# Patient Record
Sex: MALE | Race: WHITE | HISPANIC OR LATINO | Employment: UNEMPLOYED | URBAN - METROPOLITAN AREA
[De-identification: names, ages, dates, MRNs, and addresses within clinical notes are randomized per-mention and may not be internally consistent; named-entity substitution may affect disease eponyms.]

---

## 2021-03-29 ENCOUNTER — IMMUNIZATION (OUTPATIENT)
Dept: FAMILY MEDICINE | Facility: CLINIC | Age: 26
End: 2021-03-29

## 2021-03-29 DIAGNOSIS — Z23 NEED FOR VACCINATION: Primary | ICD-10-CM

## 2021-03-29 PROCEDURE — 0001A COVID-19, MRNA, LNP-S, PF, 30 MCG/0.3 ML DOSE VACCINE: CPT | Mod: CV19,,, | Performed by: FAMILY MEDICINE

## 2021-03-29 PROCEDURE — 0001A COVID-19, MRNA, LNP-S, PF, 30 MCG/0.3 ML DOSE VACCINE: ICD-10-PCS | Mod: CV19,,, | Performed by: FAMILY MEDICINE

## 2021-03-29 PROCEDURE — 91300 COVID-19, MRNA, LNP-S, PF, 30 MCG/0.3 ML DOSE VACCINE: ICD-10-PCS | Mod: ,,, | Performed by: FAMILY MEDICINE

## 2021-03-29 PROCEDURE — 91300 COVID-19, MRNA, LNP-S, PF, 30 MCG/0.3 ML DOSE VACCINE: CPT | Mod: ,,, | Performed by: FAMILY MEDICINE

## 2021-04-19 ENCOUNTER — IMMUNIZATION (OUTPATIENT)
Dept: FAMILY MEDICINE | Facility: CLINIC | Age: 26
End: 2021-04-19

## 2021-04-19 DIAGNOSIS — Z23 NEED FOR VACCINATION: Primary | ICD-10-CM

## 2021-04-19 PROCEDURE — 91300 COVID-19, MRNA, LNP-S, PF, 30 MCG/0.3 ML DOSE VACCINE: ICD-10-PCS | Mod: ,,, | Performed by: INTERNAL MEDICINE

## 2021-04-19 PROCEDURE — 0002A COVID-19, MRNA, LNP-S, PF, 30 MCG/0.3 ML DOSE VACCINE: ICD-10-PCS | Mod: CV19,,, | Performed by: INTERNAL MEDICINE

## 2021-04-19 PROCEDURE — 0002A COVID-19, MRNA, LNP-S, PF, 30 MCG/0.3 ML DOSE VACCINE: CPT | Mod: CV19,,, | Performed by: INTERNAL MEDICINE

## 2021-04-19 PROCEDURE — 91300 COVID-19, MRNA, LNP-S, PF, 30 MCG/0.3 ML DOSE VACCINE: CPT | Mod: ,,, | Performed by: INTERNAL MEDICINE

## 2021-07-29 ENCOUNTER — OFFICE VISIT (OUTPATIENT)
Dept: FAMILY MEDICINE | Facility: CLINIC | Age: 26
End: 2021-07-29
Payer: OTHER GOVERNMENT

## 2021-07-29 VITALS
OXYGEN SATURATION: 98 % | BODY MASS INDEX: 18.81 KG/M2 | DIASTOLIC BLOOD PRESSURE: 66 MMHG | HEART RATE: 67 BPM | WEIGHT: 124.13 LBS | SYSTOLIC BLOOD PRESSURE: 102 MMHG | HEIGHT: 68 IN

## 2021-07-29 DIAGNOSIS — Z00.00 WELLNESS EXAMINATION: Primary | ICD-10-CM

## 2021-07-29 DIAGNOSIS — B07.9 WART ON THUMB: ICD-10-CM

## 2021-07-29 DIAGNOSIS — M25.562 CHRONIC PAIN OF BOTH KNEES: ICD-10-CM

## 2021-07-29 DIAGNOSIS — M25.561 CHRONIC PAIN OF BOTH KNEES: ICD-10-CM

## 2021-07-29 DIAGNOSIS — K21.9 GASTROESOPHAGEAL REFLUX DISEASE WITHOUT ESOPHAGITIS: ICD-10-CM

## 2021-07-29 DIAGNOSIS — G89.29 CHRONIC PAIN OF BOTH KNEES: ICD-10-CM

## 2021-07-29 PROCEDURE — 99395 PREV VISIT EST AGE 18-39: CPT | Mod: S$PBB,,, | Performed by: INTERNAL MEDICINE

## 2021-07-29 PROCEDURE — 99999 PR PBB SHADOW E&M-EST. PATIENT-LVL IV: ICD-10-PCS | Mod: PBBFAC,,, | Performed by: INTERNAL MEDICINE

## 2021-07-29 PROCEDURE — 99999 PR PBB SHADOW E&M-EST. PATIENT-LVL IV: CPT | Mod: PBBFAC,,, | Performed by: INTERNAL MEDICINE

## 2021-07-29 PROCEDURE — 99214 OFFICE O/P EST MOD 30 MIN: CPT | Mod: PBBFAC,PO | Performed by: INTERNAL MEDICINE

## 2021-07-29 PROCEDURE — 99395 PR PREVENTIVE VISIT,EST,18-39: ICD-10-PCS | Mod: S$PBB,,, | Performed by: INTERNAL MEDICINE

## 2021-07-29 RX ORDER — CALCIUM CARB/MAGNESIUM CARB 311-232MG
TABLET ORAL
COMMUNITY

## 2021-07-29 RX ORDER — OMEPRAZOLE 20 MG/1
20 CAPSULE, DELAYED RELEASE ORAL DAILY
Qty: 15 CAPSULE | Refills: 0 | Status: SHIPPED | OUTPATIENT
Start: 2021-07-29 | End: 2024-01-19

## 2024-01-19 ENCOUNTER — OFFICE VISIT (OUTPATIENT)
Dept: FAMILY MEDICINE | Facility: CLINIC | Age: 29
End: 2024-01-19
Payer: COMMERCIAL

## 2024-01-19 VITALS
DIASTOLIC BLOOD PRESSURE: 68 MMHG | HEART RATE: 76 BPM | HEIGHT: 68 IN | OXYGEN SATURATION: 97 % | SYSTOLIC BLOOD PRESSURE: 110 MMHG | WEIGHT: 139.25 LBS | BODY MASS INDEX: 21.1 KG/M2

## 2024-01-19 DIAGNOSIS — H61.20 IMPACTED CERUMEN, UNSPECIFIED LATERALITY: ICD-10-CM

## 2024-01-19 DIAGNOSIS — M25.571 CHRONIC PAIN OF RIGHT ANKLE: ICD-10-CM

## 2024-01-19 DIAGNOSIS — G89.29 CHRONIC PAIN OF RIGHT ANKLE: ICD-10-CM

## 2024-01-19 DIAGNOSIS — M25.562 CHRONIC PAIN OF BOTH KNEES: ICD-10-CM

## 2024-01-19 DIAGNOSIS — M25.561 CHRONIC PAIN OF BOTH KNEES: ICD-10-CM

## 2024-01-19 DIAGNOSIS — G89.29 CHRONIC PAIN OF BOTH KNEES: ICD-10-CM

## 2024-01-19 DIAGNOSIS — Z00.00 WELLNESS EXAMINATION: Primary | ICD-10-CM

## 2024-01-19 PROCEDURE — 99395 PREV VISIT EST AGE 18-39: CPT | Mod: S$GLB,,, | Performed by: INTERNAL MEDICINE

## 2024-01-19 PROCEDURE — 99999 PR PBB SHADOW E&M-EST. PATIENT-LVL IV: CPT | Mod: PBBFAC,,, | Performed by: INTERNAL MEDICINE

## 2024-01-19 NOTE — PROGRESS NOTES
"[facility]  Primary Care   [address]       Patient ID: Sotero Rivers     Chief Complaint:   Chief Complaint   Patient presents with    Annual Exam        HPI:  Annual exam.  It has been awhile since I have seen him but he has been doing well overall.  He still does have chronic knee pain especially when he gets up from a seated position and this is made worse because his job now requires him to be at a desk for 10 hours a day on occasion.  If he has not doing that he is walking around a plan and can do up to 20,000 steps in a day which is a lot.  He did have a stingray sting to his right medial malleolus about a year ago, and has had chronic pain there since then.  The pain is more on occasion but can feel like a knife in that bone.  He is concerned he may have some retained stinger in there.  I will refer him to Dr. Liz for both of these issues.  He does need a referral to ENT for a left cerumen impaction so I have placed that.  Vital signs look good.  He politely declines a flu and COVID vaccine at this time.  Of note, his father who is also my patient was diagnosed with esophageal cancer coming up on 1 year ago but he is cancer free and I am very happy about that.    Review of Systems       Knee pain   Ankle pain     Objective:      Physical Exam   Physical Exam       Normal    Vitals:   Vitals:    01/19/24 1512   BP: 110/68   Pulse: 76   SpO2: 97%   Weight: 63.2 kg (139 lb 3.5 oz)   Height: 5' 8" (1.727 m)        Assessment:           Plan:       Sotero Rivers  was seen today for follow-up and may need lab work.    Diagnoses and all orders for this visit:    Sotero was seen today for annual exam.    Diagnoses and all orders for this visit:    Wellness examination    Chronic pain of right ankle  Comments:  h/o sting ray sting to right ankle  Orders:  -     Ambulatory referral/consult to Orthopedics; Future    Try OTC Powerstep Carmel insoles on " [employer]    Chronic pain of both knees  Discuss with Dr. Shalonda Shen cerumen, unspecified laterality  -     Ambulatory referral/consult to ENT; Future           Juan Russ MD

## 2024-01-26 ENCOUNTER — OFFICE VISIT (OUTPATIENT)
Dept: OTOLARYNGOLOGY | Facility: CLINIC | Age: 29
End: 2024-01-26
Payer: COMMERCIAL

## 2024-01-26 ENCOUNTER — CLINICAL SUPPORT (OUTPATIENT)
Dept: AUDIOLOGY | Facility: CLINIC | Age: 29
End: 2024-01-26
Payer: COMMERCIAL

## 2024-01-26 VITALS — WEIGHT: 137.56 LBS | HEIGHT: 68 IN | BODY MASS INDEX: 20.85 KG/M2

## 2024-01-26 DIAGNOSIS — H93.13 TINNITUS OF BOTH EARS: ICD-10-CM

## 2024-01-26 DIAGNOSIS — M25.571 CHRONIC PAIN OF RIGHT ANKLE: Primary | ICD-10-CM

## 2024-01-26 DIAGNOSIS — H90.5 HIGH FREQUENCY SENSORINEURAL HEARING LOSS OF LEFT EAR: ICD-10-CM

## 2024-01-26 DIAGNOSIS — H61.22 IMPACTED CERUMEN OF LEFT EAR: ICD-10-CM

## 2024-01-26 DIAGNOSIS — Z57.0 OCCUPATIONAL EXPOSURE TO NOISE: ICD-10-CM

## 2024-01-26 DIAGNOSIS — H61.22 HEARING LOSS DUE TO CERUMEN IMPACTION, LEFT: Primary | ICD-10-CM

## 2024-01-26 DIAGNOSIS — H91.92 HIGH FREQUENCY HEARING LOSS, LEFT: Primary | ICD-10-CM

## 2024-01-26 DIAGNOSIS — G89.29 CHRONIC PAIN OF RIGHT ANKLE: Primary | ICD-10-CM

## 2024-01-26 PROCEDURE — 92567 TYMPANOMETRY: CPT | Mod: S$GLB,,, | Performed by: AUDIOLOGIST

## 2024-01-26 PROCEDURE — 99203 OFFICE O/P NEW LOW 30 MIN: CPT | Mod: 25,S$GLB,, | Performed by: NURSE PRACTITIONER

## 2024-01-26 PROCEDURE — 69210 REMOVE IMPACTED EAR WAX UNI: CPT | Mod: LT,S$GLB,, | Performed by: NURSE PRACTITIONER

## 2024-01-26 PROCEDURE — 92557 COMPREHENSIVE HEARING TEST: CPT | Mod: S$GLB,,, | Performed by: AUDIOLOGIST

## 2024-01-26 PROCEDURE — 99999 PR PBB SHADOW E&M-EST. PATIENT-LVL III: CPT | Mod: PBBFAC,,, | Performed by: NURSE PRACTITIONER

## 2024-01-26 PROCEDURE — 99999 PR PBB SHADOW E&M-EST. PATIENT-LVL I: CPT | Mod: PBBFAC,,, | Performed by: AUDIOLOGIST

## 2024-01-26 SDOH — SOCIAL DETERMINANTS OF HEALTH (SDOH): OCCUPATIONAL EXPOSURE TO NOISE: Z57.0

## 2024-01-26 NOTE — Clinical Note
Please see attached audiological test results and recommendations. Please contact the patient if you have further recommendations concerning the asymmetry.

## 2024-01-26 NOTE — PROGRESS NOTES
The patient was referred by Jaylin Celaya NP for a hearing evaluation.    Report from the patient was as follows:    Tinnitus - AU  History of Loud Noise Exposure -  work environment  Family History of Hearing Loss - father with hearing loss due to loud noise exposure    Otoscopic screening revealed a clear view of TM AU    A hearing evaluation was performed today. Test results indicated mild to moderate high frequency hearing loss in the left ear and hearing within normal limits in the right ear. Impedance testing showed a Type A tympanogram in each ear, consistent with normal middle ear function.    The recommendations were as follows:    (1)  Medical evaluation due to asymmetry  (2)  Ear protection in loud noise   (3)  Hearing evaluation in one year or sooner if hearing decrease is noted       Today's test results and recommendations were discussed with the patient.

## 2024-01-26 NOTE — PROGRESS NOTES
Subjective     Patient ID: Sotero Rivers is a 28 y.o. male.    Chief Complaint: Cerumen Impaction    HPI  Patient is new to ENT, referred by Dr. Russ for consultation for cerumen impaction. Patient saw Dr. Russ last week and was noted to have cerumen impaction. Patient reports h/o recurrent cerumen impactions. Has his ears flushed regularly. Muffled hearing AS. 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 Would like to purchase custom made ear plugs for hearing protection.     Review of Systems   Constitutional: Negative.    HENT:  Positive for hearing loss.    Eyes: Negative.    Respiratory: Negative.     Cardiovascular: Negative.    Gastrointestinal: Negative.    Musculoskeletal: Negative.    Integumentary:  Negative.   Neurological: Negative.    Hematological: Negative.    Psychiatric/Behavioral: Negative.            Objective     Physical Exam  Vitals and nursing note reviewed.   Constitutional:       General: He is not in acute distress.     Appearance: He is well-developed. He is not ill-appearing.   HENT:      Head: Normocephalic and atraumatic.      Right Ear: Hearing, tympanic membrane, ear canal and external ear normal. No middle ear effusion. Tympanic membrane is not erythematous.      Left Ear: Hearing, tympanic membrane, ear canal and external ear normal.  No middle ear effusion. Tympanic membrane is not erythematous.      Nose: Nose normal.   Eyes:      General: Lids are normal. No scleral icterus.        Right eye: No discharge.         Left eye: No discharge.   Neck:      Trachea: Trachea normal. No tracheal deviation.   Cardiovascular:      Rate and Rhythm: Normal rate.   Pulmonary:      Effort: Pulmonary effort is normal. No respiratory distress.      Breath sounds: No stridor. No wheezing.   Musculoskeletal:         General: Normal range of motion.      Cervical back: Normal range of motion.   Skin:     General: Skin is warm and dry.   Neurological:      Mental Status: He is  alert and oriented to person, place, and time.      Coordination: Coordination is intact.      Gait: Gait normal.   Psychiatric:         Attention and Perception: Attention normal.         Mood and Affect: Mood normal.         Speech: Speech normal.         Behavior: Behavior normal. Behavior is cooperative.     SEPARATE PROCEDURE IN OFFICE:   Procedure: Removal of impacted cerumen, LEFT   Pre Procedure Diagnosis: Cerumen Impaction   Post Procedure Diagnosis: Cerumen Impaction   Verbal informed consent in regards to risk of trauma to ear canal, ear drum or hearing, discomfort during procedure and/or inability to remove cerumen impaction in one session or unforeseen events or complications.   No anesthesia.     Procedure in detail:   Ear canal visualized bilateral with appropriate size ear speculum utilizing Operating Head Binocular Otomicroscope   Utilizing the following:  Ring curet and 90 degree angle pick was used. The impacted cerumen of the ear canals was removed atraumatically. The TM and EAC were then inspected and found to be clear of wax. See description of TMs/EACs in PE above.   Complications: No   Condition: Improved/Good       Assessment and Plan     1. Hearing loss due to cerumen impaction, left    2. Impacted cerumen of left ear  -     Ambulatory referral/consult to ENT    3. Occupational exposure to noise    4. 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

## 2024-01-29 ENCOUNTER — PATIENT MESSAGE (OUTPATIENT)
Dept: OTOLARYNGOLOGY | Facility: CLINIC | Age: 29
End: 2024-01-29
Payer: COMMERCIAL

## 2024-01-29 ENCOUNTER — PATIENT MESSAGE (OUTPATIENT)
Dept: AUDIOLOGY | Facility: CLINIC | Age: 29
End: 2024-01-29
Payer: COMMERCIAL

## 2024-01-29 DIAGNOSIS — H90.42 SENSORINEURAL HEARING LOSS (SNHL) OF LEFT EAR WITH UNRESTRICTED HEARING OF RIGHT EAR: Primary | ICD-10-CM

## 2024-01-30 ENCOUNTER — TELEPHONE (OUTPATIENT)
Dept: OTOLARYNGOLOGY | Facility: CLINIC | Age: 29
End: 2024-01-30
Payer: COMMERCIAL

## 2024-01-30 NOTE — TELEPHONE ENCOUNTER
----- Message from Coty Porter sent at 1/30/2024  7:08 AM CST -----  Regarding: Pt called to speak to the nurse regarding his care and upcoming appt and pt would like a call back this morning  Patient Advice:    Pt called to speak to the nurse regarding his care and upcoming appt and pt would like a call back this morning    Pt can be reached at  [phone]

## 2024-02-02 ENCOUNTER — OFFICE VISIT (OUTPATIENT)
Dept: ORTHOPEDICS | Facility: CLINIC | Age: 29
End: 2024-02-02
Payer: COMMERCIAL

## 2024-02-02 ENCOUNTER — CLINICAL SUPPORT (OUTPATIENT)
Dept: AUDIOLOGY | Facility: CLINIC | Age: 29
End: 2024-02-02
Payer: COMMERCIAL

## 2024-02-02 ENCOUNTER — HOSPITAL ENCOUNTER (OUTPATIENT)
Dept: RADIOLOGY | Facility: HOSPITAL | Age: 29
Discharge: HOME | End: 2024-02-02
Attending: ORTHOPAEDIC SURGERY
Payer: COMMERCIAL

## 2024-02-02 DIAGNOSIS — G89.29 CHRONIC PAIN OF RIGHT ANKLE: ICD-10-CM

## 2024-02-02 DIAGNOSIS — S91.041A: Primary | ICD-10-CM

## 2024-02-02 DIAGNOSIS — M25.571 PAIN IN JOINT INVOLVING RIGHT ANKLE AND FOOT: ICD-10-CM

## 2024-02-02 DIAGNOSIS — M25.571 CHRONIC PAIN OF RIGHT ANKLE: ICD-10-CM

## 2024-02-02 DIAGNOSIS — Z46.2 ENCOUNTER FOR OTHER EARMOLD IMPRESSION: Primary | ICD-10-CM

## 2024-02-02 DIAGNOSIS — G89.29 CHRONIC PAIN OF BOTH KNEES: ICD-10-CM

## 2024-02-02 DIAGNOSIS — M25.562 CHRONIC PAIN OF BOTH KNEES: ICD-10-CM

## 2024-02-02 DIAGNOSIS — M25.561 CHRONIC PAIN OF BOTH KNEES: ICD-10-CM

## 2024-02-02 PROCEDURE — 99999 PR PBB SHADOW E&M-EST. PATIENT-LVL III: CPT | Mod: PBBFAC,,, | Performed by: ORTHOPAEDIC SURGERY

## 2024-02-02 PROCEDURE — 99499 UNLISTED E&M SERVICE: CPT | Mod: S$GLB,,, | Performed by: AUDIOLOGIST

## 2024-02-02 PROCEDURE — 99204 OFFICE O/P NEW MOD 45 MIN: CPT | Mod: S$GLB,,, | Performed by: ORTHOPAEDIC SURGERY

## 2024-02-02 PROCEDURE — 73630 X-RAY EXAM OF FOOT: CPT | Mod: TC,PO,RT

## 2024-02-02 PROCEDURE — 73630 X-RAY EXAM OF FOOT: CPT | Mod: 26,RT,, | Performed by: RADIOLOGY

## 2024-02-02 PROCEDURE — 73610 X-RAY EXAM OF ANKLE: CPT | Mod: 26,RT,, | Performed by: RADIOLOGY

## 2024-02-02 PROCEDURE — 99999 PR PBB SHADOW E&M-EST. PATIENT-LVL I: CPT | Mod: PBBFAC,,, | Performed by: AUDIOLOGIST

## 2024-02-02 PROCEDURE — 73610 X-RAY EXAM OF ANKLE: CPT | Mod: TC,PO,RT

## 2024-02-02 NOTE — PROGRESS NOTES
Status/Diagnosis: Right medial ankle foreign body vs granuloma.  Date of Surgery: none  Date of Injury: 04/14/2023  Return visit: After MR  X-rays on Return: none    Chief Complaint:   Chief Complaint   Patient presents with    Right Ankle - Injury, Pain     Present History:  Patient presents today via referral from Dr. Juan Russ   Sotero Rivers is a 28 y.o. male with complaints of intermittent right medial based ankle pain.  Unusual history as patient was stung by a stingray involving the medial ankle in April 2023.  Patient removed a viri shortly thereafter.  Pain has waxed and waned over the last 10+ months.  Has tried shoe wear and activity modification.  Pain can be present at rest and with weight-bearing.  Patient is active.  Works on his feet at a power plant.  Denies any drainage, fever, chills.  Denies any numbness or tingling.  Point tenderness present not just due to mass effect with shoe wear.  Otherwise healthy.  Denies tobacco use.      Past Medical History:   Diagnosis Date    Knee pain, bilateral     Wart on thumb        No past surgical history on file.    Current Outpatient Medications   Medication Sig    melatonin 5 mg TbDL Take by mouth.     No current facility-administered medications for this visit.       Review of patient's allergies indicates:  No Known Allergies    Family History   Problem Relation Age of Onset    No Known Problems Mother     Hyperlipidemia Father     Hypertension Father     Esophageal cancer Father        Social History     Socioeconomic History    Marital status: Single   Occupational History    Occupation: Works at [employer]   Tobacco Use    Smoking status: Never    Smokeless tobacco: Never   Substance and Sexual Activity    Alcohol use: Not Currently    Drug use: Never     Social Determinants of Health     Financial Resource Strain: Low Risk  (1/19/2024)    Overall Financial Resource Strain (CARDIA)     Difficulty of Paying Living Expenses: Not hard at all   Food  Insecurity: No Food Insecurity (1/19/2024)    Hunger Vital Sign     Worried About Running Out of Food in the Last Year: Never true     Ran Out of Food in the Last Year: Never true   Transportation Needs: No Transportation Needs (1/19/2024)    PRAPARE - Transportation     Lack of Transportation (Medical): No     Lack of Transportation (Non-Medical): No   Physical Activity: Insufficiently Active (1/19/2024)    Exercise Vital Sign     Days of Exercise per Week: 4 days     Minutes of Exercise per Session: 20 min   Stress: No Stress Concern Present (1/19/2024)    [place] of [employer]ational [employer] - Occupational Stress Questionnaire     Feeling of Stress : Only a little   Social Connections: Unknown (1/19/2024)    Social Connection and Isolation Panel [NHANES]     Frequency of Communication with Friends and Family: More than three times a week     Frequency of Social Gatherings with Friends and Family: More than three times a week     Active Member of Clubs or Organizations: No     Attends Club or Organization Meetings: Never     Marital Status: Never    Housing Stability: Low Risk  (1/19/2024)    Housing Stability Vital Sign     Unable to Pay for Housing in the Last Year: No     Number of Places Lived in the Last Year: 1     Unstable Housing in the Last Year: No       Physical exam:  There were no vitals filed for this visit.  There is no height or weight on file to calculate BMI.  General: In no apparent distress; well developed and well nourished.  HEENT: normocephalic; atraumatic.  Cardiovascular: regular rate.  Respiratory: no increased work of breathing.  Musculoskeletal:   Gait: nonantalgic  Inspection:   Small 3 x 8 mm area scarring versus granuloma involving the medial ankle just at the tip of the medial malleolus.  No open wound noted.  No drainage.  No adjacent warmth or erythema.  No signs or symptoms of infection.  Point tenderness on deep palpation at this site.  Full painless ankle range of  motion with no suggestion of septic arthritis.  No laxity with anterior drawer or varus/valgus talar tilt testing.  Silfverskiold: Negative  Alignment:  Knee: neutral               Ankle: neutral              Hindfoot: neutral              Forefoot: neutral   Strength:              Dorsiflexion 5/5  Plantar flexion 5/5  Inversion 5/5  Eversion 5/5  Sensation:              SILT distally  ROM:              Ankle: full and painless              Subtalar: full and painless  Pulses: Palpable pedal pulse                   Imaging Studies/Outside documentation:  I have ordered/reviewed/interpreted the following images/outside documentation:  1. Weight-bearing 3-views of Right foot and ankle:   On my independent review, no acute bony abnormality noted.  Ankle mortise remains congruent.  Moderate decreased calcaneal pitch.  Talonavicular uncoverage within normal limits.  Large accessory navicular.  Incidental os trigonum.  No significant degenerative joint changes.        Assessment:  Sotero Rivers is a 28 y.o. male with Right medial ankle foreign body vs granuloma.     Plan:   Clinical and radiographic findings were discussed.  Given the chronicity of patient's symptoms, we will obtain MRI right ankle without contrast for further evaluation.  Patient will continue to treat this symptomatically in the meantime.    Return to clinic after MRI is complete.  Pending results, may consider surgical excision if retained foreign body is indeed present.    Patient voiced understanding.  All questions were answered.    This note was created using voice recognition software and may contain grammatical errors.

## 2024-02-07 ENCOUNTER — TELEPHONE (OUTPATIENT)
Dept: AUDIOLOGY | Facility: CLINIC | Age: 29
End: 2024-02-07
Payer: COMMERCIAL

## 2024-02-07 NOTE — TELEPHONE ENCOUNTER
Left voicemail informing patient I was calling to discuss type of noise suppression plug he would like to order.

## 2024-02-09 ENCOUNTER — DOCUMENTATION ONLY (OUTPATIENT)
Dept: AUDIOLOGY | Facility: CLINIC | Age: 29
End: 2024-02-09
Payer: COMMERCIAL

## 2024-02-09 NOTE — PROGRESS NOTES
The patient ordered custom earplugs from [employer]. I got a PO number and sent the impressions off to [employer] via [employer].

## 2024-02-13 NOTE — PROGRESS NOTES
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 A right and left ear impression was prepared so that earplugs can be ordered from [facility]. The patient reported he would like the greatest Noise Reduction Rating available. The patient will be contacted after a discussion with [facility] concerning their recommendations. The patient should be contacted upon receipt of the ear plugs for  at the . The patient was informed of the limited remake period. It was recommended that the patient contact us as soon as possible if the fit of the earplugs is not satisfactory. The patient reported that any cost of less than $300 is acceptable for him.

## 2024-02-19 ENCOUNTER — DOCUMENTATION ONLY (OUTPATIENT)
Dept: AUDIOLOGY | Facility: CLINIC | Age: 29
End: 2024-02-19
Payer: COMMERCIAL

## 2024-02-19 ENCOUNTER — PATIENT MESSAGE (OUTPATIENT)
Dept: OTOLARYNGOLOGY | Facility: CLINIC | Age: 29
End: 2024-02-19
Payer: COMMERCIAL

## 2024-02-19 NOTE — PROGRESS NOTES
The patient's custom noise protection earplugs came in from [employer]. I filled out an invoice for the amount owed, and contacted the patient to let him know he can pick them up from our second floor check in desk.

## 2024-03-08 ENCOUNTER — HOSPITAL ENCOUNTER (OUTPATIENT)
Dept: RADIOLOGY | Facility: HOSPITAL | Age: 29
Discharge: HOME | End: 2024-03-08
Attending: NURSE PRACTITIONER
Payer: COMMERCIAL

## 2024-03-08 ENCOUNTER — HOSPITAL ENCOUNTER (OUTPATIENT)
Dept: RADIOLOGY | Facility: HOSPITAL | Age: 29
Discharge: HOME | End: 2024-03-08
Attending: ORTHOPAEDIC SURGERY
Payer: COMMERCIAL

## 2024-03-08 DIAGNOSIS — S91.041A: ICD-10-CM

## 2024-03-08 DIAGNOSIS — H90.42 SENSORINEURAL HEARING LOSS (SNHL) OF LEFT EAR WITH UNRESTRICTED HEARING OF RIGHT EAR: ICD-10-CM

## 2024-03-08 DIAGNOSIS — M25.571 PAIN IN JOINT INVOLVING RIGHT ANKLE AND FOOT: ICD-10-CM

## 2024-03-08 PROCEDURE — 25500020 PHARM REV CODE 255: Mod: PO | Performed by: NURSE PRACTITIONER

## 2024-03-08 PROCEDURE — 70553 MRI BRAIN STEM W/O & W/DYE: CPT | Mod: TC,PO

## 2024-03-08 PROCEDURE — A9585 GADOBUTROL INJECTION: HCPCS | Mod: PO | Performed by: NURSE PRACTITIONER

## 2024-03-08 PROCEDURE — 70553 MRI BRAIN STEM W/O & W/DYE: CPT | Mod: 26,,, | Performed by: RADIOLOGY

## 2024-03-08 PROCEDURE — 73721 MRI JNT OF LWR EXTRE W/O DYE: CPT | Mod: TC,PO,RT

## 2024-03-08 PROCEDURE — 73721 MRI JNT OF LWR EXTRE W/O DYE: CPT | Mod: 26,RT,, | Performed by: RADIOLOGY

## 2024-03-08 RX ORDER — GADOBUTROL 604.72 MG/ML
6 INJECTION INTRAVENOUS
Status: COMPLETED | OUTPATIENT
Start: 2024-03-08 | End: 2024-03-08

## 2024-03-08 RX ADMIN — GADOBUTROL 6 ML: 604.72 INJECTION INTRAVENOUS at 03:03

## 2024-03-26 ENCOUNTER — DOCUMENTATION ONLY (OUTPATIENT)
Dept: AUDIOLOGY | Facility: CLINIC | Age: 29
End: 2024-03-26
Payer: COMMERCIAL

## 2024-03-26 NOTE — PROGRESS NOTES
I sent the patient's custom earplugs back to [facility] to have them add a lanyard to them. [facility]anna spoke to the patient about this. [facility]anna told me the patient contacted [facility] and they said they will add the lanyard at no charge. I left the patient a voicemail letting him know that I sent them off to [facility] and I will call him once they return.

## 2024-03-28 ENCOUNTER — PATIENT MESSAGE (OUTPATIENT)
Dept: AUDIOLOGY | Facility: CLINIC | Age: 29
End: 2024-03-28
Payer: COMMERCIAL

## 2024-03-28 ENCOUNTER — DOCUMENTATION ONLY (OUTPATIENT)
Dept: AUDIOLOGY | Facility: CLINIC | Age: 29
End: 2024-03-28
Payer: COMMERCIAL

## 2024-03-28 NOTE — PROGRESS NOTES
[facility] called to say there will be a charge of $14.00 plus S/H to add the lanyard to the patient's custom earplugs. [facility] messaged the patient on [facility] and asked him if he approves the additional cost. The patient approved the cost, and I contacted our  to see if we can increase the PO that was originally used for the ear plugs so this change can be made to them. Once I hear back from her, either Arianna or I will contact [facility] to approve the repair. If an additional PO is needed, I will request that.

## 2024-04-01 ENCOUNTER — DOCUMENTATION ONLY (OUTPATIENT)
Dept: AUDIOLOGY | Facility: CLINIC | Age: 29
End: 2024-04-01
Payer: COMMERCIAL

## 2024-04-01 NOTE — PROGRESS NOTES
The Audiology  Susan Baumann increased PO number [phone] (requisition # [id number]) so that the cord/lanyard could be added to the patient's custom earplugs. I contacted [email or web address] to ask them to please continue with the repair since the patient approved the cost of $14.00 plus S/H. We paid shipping to send it back to [employer] via [employer], but [facility] will be sending the ear plugs back to us USPS so the patient does not need to pay for both shipping to and from.

## 2024-04-04 ENCOUNTER — DOCUMENTATION ONLY (OUTPATIENT)
Dept: AUDIOLOGY | Facility: CLINIC | Age: 29
End: 2024-04-04
Payer: COMMERCIAL

## 2024-04-04 NOTE — PROGRESS NOTES
The patient asked for an update on the lanyard/cord that is being added to his [employer] custom earplugs. I contacted [employer] and they have completed it and have sent it back to us. [facility] tracking number is: [id number]. I let the patient know we should be receiving it next week on Monday or Tuesday (April 8-9, 2024).

## 2024-04-05 ENCOUNTER — DOCUMENTATION ONLY (OUTPATIENT)
Dept: AUDIOLOGY | Facility: CLINIC | Age: 29
End: 2024-04-05
Payer: COMMERCIAL

## 2024-04-05 NOTE — PROGRESS NOTES
The patient's custom earplugs came back from [facility] with the lanyard/cord added to it. I contacted [facility] to let them know we will need a copy of the invoice, since one was not sent with it. I will fill out an invoice and contact the patient once I receive this.

## 2024-04-08 ENCOUNTER — DOCUMENTATION ONLY (OUTPATIENT)
Dept: OTOLARYNGOLOGY | Facility: CLINIC | Age: 29
End: 2024-04-08
Payer: COMMERCIAL

## 2024-04-08 NOTE — PROGRESS NOTES
I called Krystin a second time to get a copy of the invoice for the lanyard that was added to the patient's custom earplugs. Krystin had emailed the invoice to the wrong email address for me. I now have the [employer] invoice and I completed the patient's invoice and put the ear plugs at the second floor check in desk. The patient has been notified that they are ready to be picked up. [employer] only charged us $11.00 and did not charge shipping and handling.

## 2024-05-06 ENCOUNTER — OFFICE VISIT (OUTPATIENT)
Dept: FAMILY MEDICINE | Facility: CLINIC | Age: 29
End: 2024-05-06
Payer: COMMERCIAL

## 2024-05-06 DIAGNOSIS — G47.26 SHIFT WORK SLEEP DISORDER: Primary | ICD-10-CM

## 2024-05-06 PROCEDURE — 99213 OFFICE O/P EST LOW 20 MIN: CPT | Mod: 95,,, | Performed by: INTERNAL MEDICINE

## 2024-05-06 RX ORDER — DEXTROAMPHETAMINE SACCHARATE, AMPHETAMINE ASPARTATE, DEXTROAMPHETAMINE SULFATE AND AMPHETAMINE SULFATE 1.25; 1.25; 1.25; 1.25 MG/1; MG/1; MG/1; MG/1
5 TABLET ORAL DAILY
Qty: 15 TABLET | Refills: 0 | Status: SHIPPED | OUTPATIENT
Start: 2024-05-06 | End: 2024-05-21

## 2024-05-06 NOTE — PROGRESS NOTES
[facility]   [address]       Patient ID: Sotero Rivers     Chief Complaint:  Somnolence    The patient location is:  [place]  The chief complaint leading to consultation is:  Somnolence    Visit type: audiovisual    Face to Face time with patient:  5 minutes  8 minutes of total time spent on the encounter, which includes face to face time and non-face to face time preparing to see the patient (eg, review of tests), Obtaining and/or reviewing separately obtained history, Documenting clinical information in the electronic or other health record, Independently interpreting results (not separately reported) and communicating results to the patient/family/caregiver, or Care coordination (not separately reported).         Each patient to whom he or she provides medical services by telemedicine is:  (1) informed of the relationship between the physician and patient and the respective role of any other health care provider with respect to management of the patient; and (2) notified that he or she may decline to receive medical services by telemedicine and may withdraw from such care at any time.    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 which is detrimental to his job.  He met with the plans nurse who recommended Provigil that I have had better success with Adderall immediate release.  His shifts go from 5:00 p.m. to 5:00 a.m. and he tends to get mostly tired at 1:00 a.m..  He only needs a few more hours of coverage so I think Adderall IR 5 mg when needed we would be sufficient.  He will monitor his heart rate and blood pressure through the nurse at work and we will have a follow-up in a few weeks to see if this dosage is effective.    Review of Systems       Somnolence    Objective:      Physical Exam   Physical Exam       Virtual Visit     Vitals: There were no  vitals filed for this visit.     Assessment:           Plan:       Sotero Rivers  was seen today for follow-up and may need lab work.    Diagnoses and all orders for this visit:    Diagnoses and all orders for this visit:    Shift work sleep disorder  -     dextroamphetamine-amphetamine (ADDERALL) 5 mg Tab; Take 5 mg by mouth once daily. for 15 days           Juan Russ MD

## 2024-05-13 ENCOUNTER — TELEPHONE (OUTPATIENT)
Dept: FAMILY MEDICINE | Facility: CLINIC | Age: 29
End: 2024-05-13
Payer: COMMERCIAL

## 2025-01-28 ENCOUNTER — OFFICE VISIT (OUTPATIENT)
Dept: FAMILY MEDICINE | Facility: CLINIC | Age: 30
End: 2025-01-28
Payer: COMMERCIAL

## 2025-01-28 VITALS
SYSTOLIC BLOOD PRESSURE: 102 MMHG | OXYGEN SATURATION: 98 % | DIASTOLIC BLOOD PRESSURE: 72 MMHG | HEIGHT: 68 IN | WEIGHT: 132.69 LBS | BODY MASS INDEX: 20.11 KG/M2 | HEART RATE: 61 BPM

## 2025-01-28 DIAGNOSIS — M25.561 CHRONIC PAIN OF BOTH KNEES: ICD-10-CM

## 2025-01-28 DIAGNOSIS — M25.562 CHRONIC PAIN OF BOTH KNEES: ICD-10-CM

## 2025-01-28 DIAGNOSIS — H61.23 BILATERAL IMPACTED CERUMEN: ICD-10-CM

## 2025-01-28 DIAGNOSIS — G47.26 SHIFT WORK SLEEP DISORDER: ICD-10-CM

## 2025-01-28 DIAGNOSIS — Z00.00 WELLNESS EXAMINATION: Primary | ICD-10-CM

## 2025-01-28 DIAGNOSIS — G89.29 CHRONIC PAIN OF BOTH KNEES: ICD-10-CM

## 2025-01-28 PROCEDURE — 99999 PR PBB SHADOW E&M-EST. PATIENT-LVL III: CPT | Mod: PBBFAC,,, | Performed by: INTERNAL MEDICINE

## 2025-01-28 PROCEDURE — 99395 PREV VISIT EST AGE 18-39: CPT | Mod: S$GLB,,, | Performed by: INTERNAL MEDICINE

## 2025-01-28 RX ORDER — MODAFINIL 100 MG/1
100 TABLET ORAL DAILY
Qty: 30 TABLET | Refills: 0 | Status: SHIPPED | OUTPATIENT
Start: 2025-01-28 | End: 2025-02-27

## 2025-01-28 NOTE — PROGRESS NOTES
"[facility]  Primary Care   [address]       Patient ID: Sotero Rivers     Chief Complaint:   Chief Complaint   Patient presents with    Annual Exam     General wellness exam        HPI: Annual wellness exam and doing well overall. Knee still hurt sometimes and he has good an bad days. 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 We tried Adderall 6 months ago for Shift work sleep disorder but it worsened anxiety given so we abandoned it.  We are going to try low-dose Provigil 100 mg on an as-needed basis and I want him to take half a pill at 1st.  He tends to get tired in the 2:00 a.m. to 3:00 a.m. time frame so that would be the time to taken.  We are concerned about him dosing off when he drives home after a shift and this should keep him up wake and alert.  Otherwise vital signs look good.    Review of Systems       Knee pain     Objective:      Physical Exam   Physical Exam       Normal     Vitals:   Vitals:    01/28/25 0957   BP: 102/72   Pulse: 61   SpO2: 98%   Weight: 60.2 kg (132 lb 11.5 oz)   Height: 5' 8" (1.727 m)        Assessment:           Plan:       Sotero Rivers  was seen today for follow-up and may need lab work.    Diagnoses and all orders for this visit:    Sotero Faustin" was seen today for annual exam.    Diagnoses and all orders for this visit:    Wellness examination    Shift work sleep disorder  -     modafiniL (PROVIGIL) 100 MG Tab; Take 1 tablet (100 mg total) by mouth once daily.  Take 1/2 pill per night at first    Chronic pain of both knees  Maintain strength in legs          Juan Russ MD    "

## 2025-02-12 ENCOUNTER — PROCEDURE VISIT (OUTPATIENT)
Dept: OTOLARYNGOLOGY | Facility: CLINIC | Age: 30
End: 2025-02-12
Payer: COMMERCIAL

## 2025-02-12 VITALS — BODY MASS INDEX: 19.64 KG/M2 | WEIGHT: 129.19 LBS

## 2025-02-12 DIAGNOSIS — H61.22 IMPACTED CERUMEN OF LEFT EAR: ICD-10-CM

## 2025-02-12 PROCEDURE — 69209 REMOVE IMPACTED EAR WAX UNI: CPT | Mod: LT,S$GLB,, | Performed by: NURSE PRACTITIONER

## 2025-02-12 PROCEDURE — 99499 UNLISTED E&M SERVICE: CPT | Mod: 25,S$GLB,, | Performed by: NURSE PRACTITIONER

## 2025-02-12 NOTE — PROCEDURES
Ear Cerumen Removal    Date/Time: 2/12/2025 3:15 PM    Performed by: Jaylin Celaya NP  Authorized by: Jaylin Celaya NP    Consent Done?:  Not Needed    Local anesthetic:  None  Location details:  Left ear  Procedure type: irrigation    Procedure type comment:  Suction  Cerumen  Removal Results:  Cerumen completely removed  Patient tolerance:  Patient tolerated the procedure well with no immediate complications